# Patient Record
(demographics unavailable — no encounter records)

---

## 2024-12-14 NOTE — HISTORY OF PRESENT ILLNESS
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   He has not been seen here since the cardiac cath  Doing well No CP, SOB No palpitations or dizziness No PENELOPE, PND or orthopnea  Works part-time, walks  Not taking the ASA Probably not taking the chlorthalidone either

## 2024-12-14 NOTE — RESULTS/DATA
[TextEntry] : EKG shows sinus rhythm at 78, LAE, poor R wave progression, nonspecific T wave changes (no significant change from 9/21/2021

## 2024-12-14 NOTE — REASON FOR VISIT
[Hyperlipidemia] : hyperlipidemia [Hypertension] : hypertension [Coronary Artery Disease] : coronary artery disease [FreeTextEntry3] : Dr Kai Shepherd

## 2025-03-19 NOTE — REASON FOR VISIT
[Hyperlipidemia] : hyperlipidemia [Hypertension] : hypertension [Coronary Artery Disease] : coronary artery disease [FreeTextEntry3] : LIBBY Shepherd

## 2025-04-29 NOTE — REASON FOR VISIT
[Hyperlipidemia] : hyperlipidemia [Hypertension] : hypertension [Coronary Artery Disease] : coronary artery disease [FreeTextEntry3] : Dr Shepherd